# Patient Record
Sex: FEMALE | URBAN - METROPOLITAN AREA
[De-identification: names, ages, dates, MRNs, and addresses within clinical notes are randomized per-mention and may not be internally consistent; named-entity substitution may affect disease eponyms.]

---

## 2020-05-22 ENCOUNTER — APPOINTMENT (RX ONLY)
Dept: URBAN - METROPOLITAN AREA CLINIC 49 | Facility: CLINIC | Age: 35
Setting detail: DERMATOLOGY
End: 2020-05-22

## 2020-05-22 DIAGNOSIS — D22 MELANOCYTIC NEVI: ICD-10-CM

## 2020-05-22 DIAGNOSIS — D18.0 HEMANGIOMA: ICD-10-CM

## 2020-05-22 DIAGNOSIS — L82.1 OTHER SEBORRHEIC KERATOSIS: ICD-10-CM

## 2020-05-22 DIAGNOSIS — R20.2 PARESTHESIA OF SKIN: ICD-10-CM

## 2020-05-22 DIAGNOSIS — L81.4 OTHER MELANIN HYPERPIGMENTATION: ICD-10-CM

## 2020-05-22 DIAGNOSIS — Z71.89 OTHER SPECIFIED COUNSELING: ICD-10-CM

## 2020-05-22 PROBLEM — D22.5 MELANOCYTIC NEVI OF TRUNK: Status: ACTIVE | Noted: 2020-05-22

## 2020-05-22 PROBLEM — D18.01 HEMANGIOMA OF SKIN AND SUBCUTANEOUS TISSUE: Status: ACTIVE | Noted: 2020-05-22

## 2020-05-22 PROCEDURE — ? COUNSELING

## 2020-05-22 PROCEDURE — 99203 OFFICE O/P NEW LOW 30 MIN: CPT

## 2020-05-22 ASSESSMENT — LOCATION ZONE DERM
LOCATION ZONE: FACE
LOCATION ZONE: TRUNK

## 2020-05-22 ASSESSMENT — LOCATION SIMPLE DESCRIPTION DERM
LOCATION SIMPLE: RIGHT UPPER BACK
LOCATION SIMPLE: LEFT CHEEK
LOCATION SIMPLE: LEFT UPPER BACK
LOCATION SIMPLE: ABDOMEN
LOCATION SIMPLE: RIGHT LOWER BACK

## 2020-05-22 ASSESSMENT — LOCATION DETAILED DESCRIPTION DERM
LOCATION DETAILED: EPIGASTRIC SKIN
LOCATION DETAILED: LEFT INFERIOR UPPER BACK
LOCATION DETAILED: RIGHT INFERIOR LATERAL LOWER BACK
LOCATION DETAILED: LEFT INFERIOR CENTRAL MALAR CHEEK
LOCATION DETAILED: RIGHT MID-UPPER BACK
LOCATION DETAILED: LEFT SUPERIOR UPPER BACK

## 2020-05-22 NOTE — PROCEDURE: MIPS QUALITY
Quality 130: Documentation Of Current Medications In The Medical Record: Eligible clinician attests to documenting in the medical record the patient is not eligible for a current list of medications being obtained, updated, or reviewed by the eligible clinician
Detail Level: Zone
Quality 226: Preventive Care And Screening: Tobacco Use: Screening And Cessation Intervention: Patient screened for tobacco use, is a smoker AND received Cessation Counseling

## 2021-09-10 ENCOUNTER — APPOINTMENT (RX ONLY)
Dept: URBAN - METROPOLITAN AREA CLINIC 49 | Facility: CLINIC | Age: 36
Setting detail: DERMATOLOGY
End: 2021-09-10

## 2021-09-10 DIAGNOSIS — L81.4 OTHER MELANIN HYPERPIGMENTATION: ICD-10-CM

## 2021-09-10 DIAGNOSIS — D18.0 HEMANGIOMA: ICD-10-CM

## 2021-09-10 DIAGNOSIS — D22 MELANOCYTIC NEVI: ICD-10-CM

## 2021-09-10 DIAGNOSIS — L82.1 OTHER SEBORRHEIC KERATOSIS: ICD-10-CM

## 2021-09-10 DIAGNOSIS — Z71.89 OTHER SPECIFIED COUNSELING: ICD-10-CM

## 2021-09-10 PROBLEM — D18.01 HEMANGIOMA OF SKIN AND SUBCUTANEOUS TISSUE: Status: ACTIVE | Noted: 2021-09-10

## 2021-09-10 PROBLEM — D22.5 MELANOCYTIC NEVI OF TRUNK: Status: ACTIVE | Noted: 2021-09-10

## 2021-09-10 PROCEDURE — 99213 OFFICE O/P EST LOW 20 MIN: CPT

## 2021-09-10 PROCEDURE — ? SUNSCREEN RECOMMENDATIONS

## 2021-09-10 PROCEDURE — ? COUNSELING

## 2021-09-10 ASSESSMENT — LOCATION SIMPLE DESCRIPTION DERM
LOCATION SIMPLE: LEFT CHEEK
LOCATION SIMPLE: ABDOMEN
LOCATION SIMPLE: LEFT UPPER BACK

## 2021-09-10 ASSESSMENT — LOCATION DETAILED DESCRIPTION DERM
LOCATION DETAILED: LEFT SUPERIOR UPPER BACK
LOCATION DETAILED: LEFT INFERIOR UPPER BACK
LOCATION DETAILED: EPIGASTRIC SKIN
LOCATION DETAILED: LEFT INFERIOR CENTRAL MALAR CHEEK

## 2021-09-10 ASSESSMENT — LOCATION ZONE DERM
LOCATION ZONE: FACE
LOCATION ZONE: TRUNK

## 2023-08-15 ENCOUNTER — APPOINTMENT (RX ONLY)
Dept: URBAN - METROPOLITAN AREA CLINIC 49 | Facility: CLINIC | Age: 38
Setting detail: DERMATOLOGY
End: 2023-08-15

## 2023-08-15 DIAGNOSIS — L82.0 INFLAMED SEBORRHEIC KERATOSIS: ICD-10-CM

## 2023-08-15 DIAGNOSIS — L23.9 ALLERGIC CONTACT DERMATITIS, UNSPECIFIED CAUSE: ICD-10-CM

## 2023-08-15 DIAGNOSIS — L738 OTHER SPECIFIED DISEASES OF HAIR AND HAIR FOLLICLES: ICD-10-CM

## 2023-08-15 DIAGNOSIS — D18.0 HEMANGIOMA: ICD-10-CM

## 2023-08-15 DIAGNOSIS — L82.1 OTHER SEBORRHEIC KERATOSIS: ICD-10-CM

## 2023-08-15 DIAGNOSIS — L663 OTHER SPECIFIED DISEASES OF HAIR AND HAIR FOLLICLES: ICD-10-CM

## 2023-08-15 DIAGNOSIS — L81.4 OTHER MELANIN HYPERPIGMENTATION: ICD-10-CM

## 2023-08-15 DIAGNOSIS — D22 MELANOCYTIC NEVI: ICD-10-CM

## 2023-08-15 DIAGNOSIS — L73.9 FOLLICULAR DISORDER, UNSPECIFIED: ICD-10-CM

## 2023-08-15 PROBLEM — D22.5 MELANOCYTIC NEVI OF TRUNK: Status: ACTIVE | Noted: 2023-08-15

## 2023-08-15 PROBLEM — D22.72 MELANOCYTIC NEVI OF LEFT LOWER LIMB, INCLUDING HIP: Status: ACTIVE | Noted: 2023-08-15

## 2023-08-15 PROBLEM — D18.01 HEMANGIOMA OF SKIN AND SUBCUTANEOUS TISSUE: Status: ACTIVE | Noted: 2023-08-15

## 2023-08-15 PROBLEM — D22.71 MELANOCYTIC NEVI OF RIGHT LOWER LIMB, INCLUDING HIP: Status: ACTIVE | Noted: 2023-08-15

## 2023-08-15 PROBLEM — L02.821 FURUNCLE OF HEAD [ANY PART, EXCEPT FACE]: Status: ACTIVE | Noted: 2023-08-15

## 2023-08-15 PROCEDURE — ? PRESCRIPTION

## 2023-08-15 PROCEDURE — ? SUNSCREEN RECOMMENDATIONS

## 2023-08-15 PROCEDURE — 17110 DESTRUCTION B9 LES UP TO 14: CPT

## 2023-08-15 PROCEDURE — ? FULL BODY SKIN EXAM

## 2023-08-15 PROCEDURE — ? ADDITIONAL NOTES

## 2023-08-15 PROCEDURE — ? LIQUID NITROGEN

## 2023-08-15 PROCEDURE — ? COUNSELING

## 2023-08-15 PROCEDURE — 99213 OFFICE O/P EST LOW 20 MIN: CPT | Mod: 25

## 2023-08-15 PROCEDURE — ? PRESCRIPTION MEDICATION MANAGEMENT

## 2023-08-15 RX ORDER — CLOBETASOL PROPIONATE 0.5 MG/G
OINTMENT TOPICAL
Qty: 15 | Refills: 1 | Status: ERX | COMMUNITY
Start: 2023-08-15

## 2023-08-15 RX ADMIN — CLOBETASOL PROPIONATE: 0.5 OINTMENT TOPICAL at 00:00

## 2023-08-15 ASSESSMENT — LOCATION ZONE DERM
LOCATION ZONE: SCALP
LOCATION ZONE: ARM
LOCATION ZONE: TRUNK
LOCATION ZONE: LEG
LOCATION ZONE: FACE

## 2023-08-15 ASSESSMENT — LOCATION DETAILED DESCRIPTION DERM
LOCATION DETAILED: RIGHT SUPERIOR FOREHEAD
LOCATION DETAILED: LEFT ANTERIOR PROXIMAL THIGH
LOCATION DETAILED: LEFT ANTERIOR DISTAL UPPER ARM
LOCATION DETAILED: LEFT MEDIAL BREAST 10-11:00 REGION
LOCATION DETAILED: RIGHT BUTTOCK
LOCATION DETAILED: MIDDLE STERNUM
LOCATION DETAILED: LEFT DISTAL PRETIBIAL REGION
LOCATION DETAILED: RIGHT ANTERIOR PROXIMAL THIGH
LOCATION DETAILED: RIGHT VENTRAL DISTAL FOREARM
LOCATION DETAILED: PERIUMBILICAL SKIN
LOCATION DETAILED: LEFT LATERAL ABDOMEN
LOCATION DETAILED: LEFT ANTERIOR DISTAL THIGH
LOCATION DETAILED: RIGHT ANTERIOR DISTAL THIGH
LOCATION DETAILED: LEFT MEDIAL FRONTAL SCALP
LOCATION DETAILED: RIGHT ANTERIOR DISTAL UPPER ARM
LOCATION DETAILED: RIGHT CENTRAL MALAR CHEEK
LOCATION DETAILED: RIGHT SUPERIOR MEDIAL UPPER BACK

## 2023-08-15 ASSESSMENT — LOCATION SIMPLE DESCRIPTION DERM
LOCATION SIMPLE: RIGHT BUTTOCK
LOCATION SIMPLE: ABDOMEN
LOCATION SIMPLE: LEFT UPPER ARM
LOCATION SIMPLE: CHEST
LOCATION SIMPLE: LEFT SCALP
LOCATION SIMPLE: RIGHT CHEEK
LOCATION SIMPLE: RIGHT THIGH
LOCATION SIMPLE: LEFT PRETIBIAL REGION
LOCATION SIMPLE: RIGHT FOREHEAD
LOCATION SIMPLE: RIGHT UPPER BACK
LOCATION SIMPLE: LEFT BREAST
LOCATION SIMPLE: RIGHT UPPER ARM
LOCATION SIMPLE: LEFT THIGH
LOCATION SIMPLE: RIGHT FOREARM

## 2023-08-15 NOTE — PROCEDURE: LIQUID NITROGEN
Detail Level: Detailed
Add 52 Modifier (Optional): no
Show Aperture Variable?: Yes
Post-Care Instructions: I reviewed with the patient in detail post-care instructions. Patient is to wear sunprotection, and avoid picking at any of the treated lesions. Pt may apply Vaseline to crusted or scabbing areas.
Medical Necessity Clause: This procedure was medically necessary because the lesions that were treated were:
Spray Paint Text: The liquid nitrogen was applied to the skin utilizing a spray paint frosting technique.
Consent: The patient's consent was obtained including but not limited to risks of crusting, scabbing, blistering, scarring, darker or lighter pigmentary change, recurrence, incomplete removal and infection.
Medical Necessity Information: It is in your best interest to select a reason for this procedure from the list below. All of these items fulfill various CMS LCD requirements except the new and changing color options.

## 2023-08-15 NOTE — PROCEDURE: ADDITIONAL NOTES
Render Risk Assessment In Note?: no
Detail Level: Simple
Additional Notes: Offered topical clindamycin but she declines at this time; informed her that lesions might come and go but if an individual lesion does not resolve in 2-3 weeks, to RTC

## 2023-08-15 NOTE — PROCEDURE: COUNSELING
Detail Level: Zone
Sunscreen Recommendations: Recommended she see a plastic surgeon if she would like them removed.
Detail Level: Detailed
Patient Specific Counseling (Will Not Stick From Patient To Patient): RTC if lesion does not fully resolve in 2-3 weeks

## 2023-08-15 NOTE — PROCEDURE: PRESCRIPTION MEDICATION MANAGEMENT
Detail Level: Zone
Render In Strict Bullet Format?: No
Initiate Treatment: clobetasol 0.05 % topical ointment BID x 2 weeks to aa